# Patient Record
Sex: MALE | Race: BLACK OR AFRICAN AMERICAN | NOT HISPANIC OR LATINO | Employment: FULL TIME | URBAN - METROPOLITAN AREA
[De-identification: names, ages, dates, MRNs, and addresses within clinical notes are randomized per-mention and may not be internally consistent; named-entity substitution may affect disease eponyms.]

---
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
## 2019-07-01 ENCOUNTER — NON-PROVIDER VISIT (OUTPATIENT)
Dept: OCCUPATIONAL MEDICINE | Facility: CLINIC | Age: 19
End: 2019-07-01

## 2019-07-01 DIAGNOSIS — Z02.1 PRE-EMPLOYMENT DRUG SCREENING: ICD-10-CM

## 2019-07-01 LAB
AMP AMPHETAMINE: NORMAL
BAR BARBITURATES: NORMAL
BZO BENZODIAZEPINES: NORMAL
COC COCAINE: NORMAL
INT CON NEG: NORMAL
INT CON POS: NORMAL
MDMA ECSTASY: NORMAL
MET METHAMPHETAMINES: NORMAL
MTD METHADONE: NORMAL
OPI OPIATES: NORMAL
OXY OXYCODONE: NORMAL
PCP PHENCYCLIDINE: NORMAL
POC URINE DRUG SCREEN OCDRS: NORMAL
THC: NORMAL

## 2019-07-01 PROCEDURE — 80305 DRUG TEST PRSMV DIR OPT OBS: CPT | Performed by: PREVENTIVE MEDICINE

## 2019-11-08 VITALS
BODY MASS INDEX: 23.7 KG/M2 | DIASTOLIC BLOOD PRESSURE: 83 MMHG | WEIGHT: 160 LBS | RESPIRATION RATE: 16 BRPM | SYSTOLIC BLOOD PRESSURE: 144 MMHG | HEART RATE: 92 BPM | HEIGHT: 69 IN | OXYGEN SATURATION: 99 % | TEMPERATURE: 97.2 F

## 2019-11-08 PROCEDURE — 99284 EMERGENCY DEPT VISIT MOD MDM: CPT

## 2019-11-09 ENCOUNTER — HOSPITAL ENCOUNTER (EMERGENCY)
Facility: MEDICAL CENTER | Age: 19
End: 2019-11-09
Attending: EMERGENCY MEDICINE
Payer: MEDICAID

## 2019-11-09 DIAGNOSIS — M62.830 MUSCLE SPASM OF BACK: ICD-10-CM

## 2019-11-09 DIAGNOSIS — R11.2 NON-INTRACTABLE VOMITING WITH NAUSEA, UNSPECIFIED VOMITING TYPE: ICD-10-CM

## 2019-11-09 LAB
APPEARANCE UR: CLEAR
BACTERIA #/AREA URNS HPF: NEGATIVE /HPF
BILIRUB UR QL STRIP.AUTO: NEGATIVE
COLOR UR: YELLOW
EPI CELLS #/AREA URNS HPF: NEGATIVE /HPF
GLUCOSE UR STRIP.AUTO-MCNC: NEGATIVE MG/DL
HYALINE CASTS #/AREA URNS LPF: ABNORMAL /LPF
KETONES UR STRIP.AUTO-MCNC: 40 MG/DL
LEUKOCYTE ESTERASE UR QL STRIP.AUTO: ABNORMAL
MICRO URNS: ABNORMAL
NITRITE UR QL STRIP.AUTO: NEGATIVE
PH UR STRIP.AUTO: 5 [PH] (ref 5–8)
PROT UR QL STRIP: NEGATIVE MG/DL
RBC # URNS HPF: ABNORMAL /HPF
RBC UR QL AUTO: NEGATIVE
SP GR UR STRIP.AUTO: 1.02
UROBILINOGEN UR STRIP.AUTO-MCNC: 1 MG/DL
WBC #/AREA URNS HPF: ABNORMAL /HPF

## 2019-11-09 PROCEDURE — 36415 COLL VENOUS BLD VENIPUNCTURE: CPT

## 2019-11-09 PROCEDURE — 700102 HCHG RX REV CODE 250 W/ 637 OVERRIDE(OP): Performed by: EMERGENCY MEDICINE

## 2019-11-09 PROCEDURE — 96374 THER/PROPH/DIAG INJ IV PUSH: CPT

## 2019-11-09 PROCEDURE — A9270 NON-COVERED ITEM OR SERVICE: HCPCS | Performed by: EMERGENCY MEDICINE

## 2019-11-09 PROCEDURE — 700111 HCHG RX REV CODE 636 W/ 250 OVERRIDE (IP): Performed by: EMERGENCY MEDICINE

## 2019-11-09 PROCEDURE — 81001 URINALYSIS AUTO W/SCOPE: CPT

## 2019-11-09 PROCEDURE — 96375 TX/PRO/DX INJ NEW DRUG ADDON: CPT

## 2019-11-09 RX ORDER — CYCLOBENZAPRINE HCL 10 MG
10 TABLET ORAL ONCE
Status: COMPLETED | OUTPATIENT
Start: 2019-11-09 | End: 2019-11-09

## 2019-11-09 RX ORDER — HYDROCODONE BITARTRATE AND ACETAMINOPHEN 5; 325 MG/1; MG/1
2 TABLET ORAL ONCE
Status: COMPLETED | OUTPATIENT
Start: 2019-11-09 | End: 2019-11-09

## 2019-11-09 RX ORDER — KETOROLAC TROMETHAMINE 30 MG/ML
30 INJECTION, SOLUTION INTRAMUSCULAR; INTRAVENOUS ONCE
Status: COMPLETED | OUTPATIENT
Start: 2019-11-09 | End: 2019-11-09

## 2019-11-09 RX ORDER — HYDROCODONE BITARTRATE AND ACETAMINOPHEN 5; 325 MG/1; MG/1
1-2 TABLET ORAL EVERY 6 HOURS PRN
Qty: 12 TAB | Refills: 0 | Status: SHIPPED | OUTPATIENT
Start: 2019-11-09 | End: 2019-11-12

## 2019-11-09 RX ORDER — ONDANSETRON 2 MG/ML
4 INJECTION INTRAMUSCULAR; INTRAVENOUS ONCE
Status: COMPLETED | OUTPATIENT
Start: 2019-11-09 | End: 2019-11-09

## 2019-11-09 RX ORDER — IBUPROFEN 600 MG/1
600 TABLET ORAL EVERY 6 HOURS PRN
Qty: 30 TAB | Refills: 0 | Status: SHIPPED | OUTPATIENT
Start: 2019-11-09

## 2019-11-09 RX ORDER — CYCLOBENZAPRINE HCL 10 MG
10 TABLET ORAL 3 TIMES DAILY PRN
Qty: 30 TAB | Refills: 0 | Status: SHIPPED | OUTPATIENT
Start: 2019-11-09

## 2019-11-09 RX ORDER — ONDANSETRON 4 MG/1
4 TABLET, ORALLY DISINTEGRATING ORAL EVERY 8 HOURS PRN
Qty: 10 TAB | Refills: 0 | Status: SHIPPED | OUTPATIENT
Start: 2019-11-09

## 2019-11-09 RX ADMIN — ONDANSETRON 4 MG: 2 INJECTION INTRAMUSCULAR; INTRAVENOUS at 01:19

## 2019-11-09 RX ADMIN — HYDROCODONE BITARTRATE AND ACETAMINOPHEN 2 TABLET: 5; 325 TABLET ORAL at 02:19

## 2019-11-09 RX ADMIN — KETOROLAC TROMETHAMINE 30 MG: 30 INJECTION, SOLUTION INTRAMUSCULAR; INTRAVENOUS at 01:19

## 2019-11-09 RX ADMIN — CYCLOBENZAPRINE 10 MG: 10 TABLET, FILM COATED ORAL at 01:19

## 2019-11-09 NOTE — ED TRIAGE NOTES
"Chief Complaint   Patient presents with   • Back Pain     Patient reports that at noon he ate [employer] and since then has been vomiting, aches, and back pain    • N/V     /83   Pulse 92   Temp 36.2 °C (97.2 °F) (Temporal)   Resp 16   Ht 1.753 m (5' 9\")   Wt 72.6 kg (160 lb)   SpO2 99%       Patient placed in [facility] due to having an infant, educated on ed triage process, instructed to notify staff of any new or worsening symptoms.   "

## 2019-11-09 NOTE — DISCHARGE INSTRUCTIONS
Ice to painful areas 20 minutes at a time 3 - 4 times a day. Massage may help. No drinking or driving on Norco or Flexeril. Return for increasing pain, weakness, numbness, abdominal pain, fever, or loss of bowel or bladder function.

## 2019-11-09 NOTE — ED NOTES
Pt given discharge instructions. Medication education provided. Pt aware not to drive after taking narcotics. PIV removed, dressing applied. Signed copy in chart. Pt states all belongings in possession. Pt ambulatory off unit with steady gait.

## 2019-11-09 NOTE — ED NOTES
"Pt to G37 via wheelchair, in obvious discomfort.  Reporting 10/10 back pain \"from my butt to my neck\"  "

## 2019-11-09 NOTE — ED PROVIDER NOTES
ED Provider Note    Scribed for Jose David Hanna M.D. by Thomas Gross. 11/9/2019, 1:08 AM.    Primary care provider: Pcp Pt States None  Means of arrival: Walk-in  History obtained from: Patient  History limited by: None    CHIEF COMPLAINT  Chief Complaint   Patient presents with   • Back Pain     Patient reports that at noon he ate [employer] and since then has been vomiting, aches, and back pain    • N/V       HPI  Gabe Moctezuma is a 19 y.o. male who presents to the Emergency Department for evaluation of back pain onset 10 hours ago. Patient states that he ate [employer] for lunch and shortly after had 7-8 episodes of emesis which he describes as pink in color. He reports that he developed back pain that radiates to his neck due to the exertion from vomiting. Additionally, he reports of a headache, diarrhea and chills, but denies any fever, numbness or tingling of the extremities. He states he was nauseous, which since has resolved. Patient states that he had a back muscle spasm at the age of 8 which paralyzed him for 10 minutes. No history of IV drug use. No allergies to medications.     REVIEW OF SYSTEMS  Pertinent positives include back pain, neck pain, headache, diarrhea and chills. Pertinent negatives include nausea, fever, numbness or tingling of the extremities.    PAST MEDICAL HISTORY   Back spasms    SURGICAL HISTORY  patient denies any surgical history    SOCIAL HISTORY  Social History     Tobacco Use   • Smoking status: Current Every Day Smoker     Packs/day: 0.50     Types: Cigarettes   • Smokeless tobacco: Never Used   Substance Use Topics   • Alcohol use: Yes     Comment: occ.   • Drug use: No      Social History     Substance and Sexual Activity   Drug Use No       FAMILY HISTORY  None noted.    CURRENT MEDICATIONS  Home Medications     Reviewed by Karlene Bellamy R.N. (Registered Nurse) on 11/08/19 at 2351  Med List Status: <None>   Medication Last Dose Status        Patient Refugio Taking any  "Medications                     ALLERGIES  No Known Allergies    PHYSICAL EXAM  VITAL SIGNS: /83   Pulse 92   Temp 36.2 °C (97.2 °F) (Temporal)   Resp 16   Ht 1.753 m (5' 9\")   Wt 72.6 kg (160 lb)   SpO2 99%   BMI 23.63 kg/m²     Constitutional: Well developed, Well nourished, moderate distress.   HENT: Normocephalic, Atraumatic, Oropharynx moist.   Eyes: Conjunctiva normal, No discharge.   Neck: Supple, No stridor. No vertebral point tenderness. Paraspinal muscle tenderness.  Cardiovascular: Normal heart rate, Normal rhythm, No murmurs, equal pulses.   Pulmonary: Normal breath sounds, No respiratory distress, No wheezing, No rales, No rhonchi.  Chest: No chest wall tenderness or deformity.   Abdomen: No pain or tenderness over McBurney's point. Soft, No masses, no rebound, no guarding.   Back:  Paraspinal muscle spasm and tenderness to T12-L5 bilaterally. Negative straight leg raise bilaterally to 90 degrees. No CVA tenderness.   Musculoskeletal: No major deformities noted.  Skin: Warm, Dry, No erythema, No rash.   Neurologic: Alert & oriented x 3, Normal motor function,  No focal deficits noted.   Psychiatric: Affect normal, Judgment normal, Mood normal.     LABS  Results for orders placed or performed during the hospital encounter of 11/09/19   URINALYSIS (UA)   Result Value Ref Range    Color Yellow     Character Clear     Specific Gravity 1.023 <1.035    Ph 5.0 5.0 - 8.0    Glucose Negative Negative mg/dL    Ketones 40 (A) Negative mg/dL    Protein Negative Negative mg/dL    Bilirubin Negative Negative    Urobilinogen, Urine 1.0 Negative    Nitrite Negative Negative    Leukocyte Esterase Trace (A) Negative    Occult Blood Negative Negative    Micro Urine Req Microscopic    URINE MICROSCOPIC (W/UA)   Result Value Ref Range    WBC 0-2 (A) /hpf    RBC 0-2 (A) /hpf    Bacteria Negative None /hpf    Epithelial Cells Negative /hpf    Hyaline Cast 0-2 /lpf     All labs reviewed by me.    COURSE & MEDICAL " DECISION MAKING  Pertinent Labs & Imaging studies reviewed. (See chart for details)    1:08 AM - Patient seen and examined at bedside. Patient will be treated with flexeril 10 mg, zofran 4 mg, toradol 30 mg,and norco 5-325 mg. Ordered urine microscopic and urnialyis to evaluate his symptoms. The differential diagnoses include but are not limited to: gastroenteritis vs back muscle spasm vs UTI vs food poisoning.      Medical Decision Making: At this point time I think the patient most likely got gastroenteritis or food poisoning causing significant nausea and vomiting.  I think because of the vomiting he caused muscle spasms in his back.  Patient does not have any vertebral point tenderness I do not think he has any discitis or epidural abscess.  Patient does not have any abdominal pain there is really no stopped.  At this point time will discharge patient home with Flexeril to help with back pain and muscle spasm as well as Norco for pain.  Patient will be given Zofran in case he has further vomiting.    I reviewed prescription monitoring program for patient's narcotic use before prescribing a scheduled drug.The patient will not drink alcohol nor drive with prescribed medications. The patient will return for new or worsening symptoms and is stable at the time of discharge.    The patient is referred to a primary physician for blood pressure management, diabetic screening, and for all other preventative health concerns.    In prescribing controlled substances to this patient, I certify that I have obtained and reviewed the medical history of Gabe Moctezuma. I have also made a good shari effort to obtain applicable records from other providers who have treated the patient and records did not demonstrate any increased risk of substance abuse that would prevent me from prescribing controlled substances.     I have conducted a physical exam and documented it. I have reviewed Mr. Moctezuma’s prescription history as maintained by  the [place] Prescription Monitoring Program.     I have assessed the patient’s risk for abuse, dependency, and addiction using the validated Opioid Risk Tool available at https://www.mdcalc.com/fzpony-yjyw-xybe-ort-narcotic-abuse.     Given the above, I believe the benefits of controlled substance therapy outweigh the risks. The reasons for prescribing controlled substances include non-narcotic, oral analgesic alternatives have been inadequate for pain control. Accordingly, I have discussed the risk and benefits, treatment plan, and alternative therapies with the patient.     DISPOSITION:  Patient will be discharged home in stable condition.    FOLLOW UP:  Your doctor    Schedule an appointment as soon as possible for a visit in 1 week      [address] [address]  [phone]    If you need a doctor    [address] [address] [phone]    If you need a doctor      OUTPATIENT MEDICATIONS:  New Prescriptions    CYCLOBENZAPRINE (FLEXERIL) 10 MG TAB    Take 1 Tab by mouth 3 times a day as needed.    HYDROCODONE-ACETAMINOPHEN (NORCO) 5-325 MG TAB PER TABLET    Take 1-2 Tabs by mouth every 6 hours as needed for up to 3 days.    IBUPROFEN (MOTRIN) 600 MG TAB    Take 1 Tab by mouth every 6 hours as needed.    ONDANSETRON (ZOFRAN ODT) 4 MG TABLET DISPERSIBLE    Take 1 Tab by mouth every 8 hours as needed.       FINAL IMPRESSION  1. Muscle spasm of back    2. Non-intractable vomiting with nausea, unspecified vomiting type          I, Thomas Gross (Devendra), am scribing for, and in the presence of, Jose David Hanna M.D.    Electronically signed by: Thomas Gross (Devendra), 11/9/2019    IJose David M.D. personally performed the services described in this documentation, as scribed by Thomas Gross in my presence, and it is both accurate and complete.E.    The note accurately reflects work and decisions made by me.  Jose David QUIROGA  Jacques  11/9/2019  3:37 AM

## 2021-11-18 ENCOUNTER — HOSPITAL ENCOUNTER (EMERGENCY)
Facility: MEDICAL CENTER | Age: 21
End: 2021-11-18
Attending: EMERGENCY MEDICINE
Payer: MEDICAID

## 2021-11-18 ENCOUNTER — APPOINTMENT (OUTPATIENT)
Dept: RADIOLOGY | Facility: MEDICAL CENTER | Age: 21
End: 2021-11-18
Attending: EMERGENCY MEDICINE
Payer: MEDICAID

## 2021-11-18 VITALS
TEMPERATURE: 96.9 F | OXYGEN SATURATION: 98 % | RESPIRATION RATE: 15 BRPM | DIASTOLIC BLOOD PRESSURE: 75 MMHG | HEART RATE: 70 BPM | BODY MASS INDEX: 23.28 KG/M2 | WEIGHT: 157.63 LBS | SYSTOLIC BLOOD PRESSURE: 124 MMHG

## 2021-11-18 DIAGNOSIS — M24.542 CONTRACTURE OF FINGER JOINT, LEFT: ICD-10-CM

## 2021-11-18 PROCEDURE — 99283 EMERGENCY DEPT VISIT LOW MDM: CPT

## 2021-11-18 PROCEDURE — 700101 HCHG RX REV CODE 250: Performed by: EMERGENCY MEDICINE

## 2021-11-18 PROCEDURE — A9270 NON-COVERED ITEM OR SERVICE: HCPCS | Performed by: EMERGENCY MEDICINE

## 2021-11-18 PROCEDURE — 73140 X-RAY EXAM OF FINGER(S): CPT | Mod: LT

## 2021-11-18 PROCEDURE — 700102 HCHG RX REV CODE 250 W/ 637 OVERRIDE(OP): Performed by: EMERGENCY MEDICINE

## 2021-11-18 RX ORDER — LIDOCAINE HYDROCHLORIDE 20 MG/ML
10 INJECTION, SOLUTION INFILTRATION; PERINEURAL ONCE
Status: COMPLETED | OUTPATIENT
Start: 2021-11-18 | End: 2021-11-18

## 2021-11-18 RX ORDER — AMOXICILLIN AND CLAVULANATE POTASSIUM 875; 125 MG/1; MG/1
1 TABLET, FILM COATED ORAL 2 TIMES DAILY
Qty: 14 TABLET | Refills: 0 | Status: SHIPPED | OUTPATIENT
Start: 2021-11-18 | End: 2021-11-25

## 2021-11-18 RX ORDER — AMOXICILLIN AND CLAVULANATE POTASSIUM 875; 125 MG/1; MG/1
1 TABLET, FILM COATED ORAL ONCE
Status: COMPLETED | OUTPATIENT
Start: 2021-11-18 | End: 2021-11-18

## 2021-11-18 RX ADMIN — AMOXICILLIN AND CLAVULANATE POTASSIUM 1 TABLET: 875; 125 TABLET, FILM COATED ORAL at 18:57

## 2021-11-18 RX ADMIN — LIDOCAINE HYDROCHLORIDE 10 ML: 20 INJECTION, SOLUTION INFILTRATION; PERINEURAL at 17:45

## 2021-11-18 NOTE — ED TRIAGE NOTES
Chief Complaint   Patient presents with   • Finger Pain     left finger pinkie finger , +swelling, piece of thread      Pt ambulated to triage with above complaint, pt said he had surgery in august and noted a piece of thread . Pt reports yellow discharge.

## 2021-11-19 NOTE — ED NOTES
DC home with written instructions to follow up with hand surgeon. He is ambulatory on DC.  One prescriptions sent to pharmacy and pt educated on how to take.

## 2021-11-19 NOTE — ED PROVIDER NOTES
ED Provider Note    CHIEF COMPLAINT  Chief Complaint   Patient presents with   • Finger Pain     left finger pinkie finger , +swelling, piece of thread        HPI  Gabe Moctezuma is a 21 y.o. male who presents for evaluation of left fifth digit pain and swelling.  The patient reports that in [place] several months ago he sustained a traumatic injury to the left hand.  He had to go to the operating room in [place] and had a reported tendon repair.  He then developed some complications and had to go back to the operating room at a different facility several months ago.  He noticed that he had some pain at the base in the volar aspect of the left fifth digit.  He noticed what he thought was some tissue but determine that it was likely a loose suture that was starting to come out and was covered with pus.  He denies any other medical history other than hypertension tetanus is up-to-date no high fevers or chills    REVIEW OF SYSTEMS  See HPI for further details.  No high fevers chills night sweats weight loss all other systems are negative.     PAST MEDICAL HISTORY  No past medical history on file.    FAMILY HISTORY  [unfilled]    SOCIAL HISTORY  Social History     Socioeconomic History   • Marital status: Single     Spouse name: Not on file   • Number of children: Not on file   • Years of education: Not on file   • Highest education level: Not on file   Occupational History   • Not on file   Tobacco Use   • Smoking status: Current Every Day Smoker     Packs/day: 0.50     Types: Cigarettes   • Smokeless tobacco: Never Used   Substance and Sexual Activity   • Alcohol use: Yes     Comment: occ.   • Drug use: No   • Sexual activity: Not on file   Other Topics Concern   • Not on file   Social History Narrative   • Not on file     Social Determinants of Health     Financial Resource Strain:    • Difficulty of Paying Living Expenses: Not on file   Food Insecurity:    • Worried About Running Out of Food in the Last Year: Not on  file   • Ran Out of Food in the Last Year: Not on file   Transportation Needs:    • Lack of Transportation (Medical): Not on file   • Lack of Transportation (Non-Medical): Not on file   Physical Activity:    • Days of Exercise per Week: Not on file   • Minutes of Exercise per Session: Not on file   Stress:    • Feeling of Stress : Not on file   Social Connections:    • Frequency of Communication with Friends and Family: Not on file   • Frequency of Social Gatherings with Friends and Family: Not on file   • Attends [employer] Services: Not on file   • Active Member of Clubs or Organizations: Not on file   • Attends Club or Organization Meetings: Not on file   • Marital Status: Not on file   Intimate Partner Violence:    • Fear of Current or Ex-Partner: Not on file   • Emotionally Abused: Not on file   • Physically Abused: Not on file   • Sexually Abused: Not on file   Housing Stability:    • Unable to Pay for Housing in the Last Year: Not on file   • Number of Places Lived in the Last Year: Not on file   • Unstable Housing in the Last Year: Not on file       SURGICAL HISTORY  No past surgical history on file.    CURRENT MEDICATIONS  Home Medications    **Home medications have not yet been reviewed for this encounter**         ALLERGIES  No Known Allergies    PHYSICAL EXAM  VITAL SIGNS: /75   Pulse 70   Temp 36.1 °C (96.9 °F) (Temporal)   Resp 15   Wt 71.5 kg (157 lb 10.1 oz)   SpO2 98%   BMI 23.28 kg/m²       Constitutional: Well developed, Well nourished, No acute distress, Non-toxic appearance.   HENT: Normocephalic, Atraumatic, Bilateral external ears normal, Oropharynx moist, No oral exudates, Nose normal.   Eyes: PERRLA, EOMI, Conjunctiva normal, No discharge.   Neck: Normal range of motion, No tenderness, Supple, No stridor.   Lymphatic: No lymphadenopathy noted.   Cardiovascular: Normal heart rate, Normal rhythm, No murmurs, No rubs, No gallops.   Thorax & Lungs: Normal breath sounds, No respiratory  distress, No wheezing, No chest tenderness.   Abdomen: Bowel sounds normal, Soft, No tenderness, No masses, No pulsatile masses.   Skin: Warm, Dry, No erythema, No rash.   Extremities: Left hand exam is notable for a digit that has mild circumferential swelling which apparently is chronic with chronic contracture. At the DIP joint there does appear to be some suture that is worked its way to the surface without pus   neurologic: Alert & oriented x 3, Normal motor function, Normal sensory function, No focal deficits noted.   Psychiatric: Affect normal, Judgment normal, Mood normal.     DX-FINGER(S) 2+ LEFT   Final Result      Soft tissue swelling. Otherwise negative left 5th finger series.            COURSE & MEDICAL DECISION MAKING  Pertinent Labs & Imaging studies reviewed. (See chart for details)  Digital block was performed at the base of the left digit with 2% lidocaine a total of 4 cc were used. I gently removed approximately 1.5 cm of suture material and attempted to express pus but was unable to. I suspect this is all chronic related to scar tissue and retained stitch that is working itself to the surface. Radiograph did not demonstrate any osteomyelitis or fusion of the joint. This appears to be more of a chronic issue. We will start him on Augmentin and I will refer him to hand surgery for follow-up in the next 3 to 4 days.    FINAL IMPRESSION  1. Left fifth finger postoperative contracture with retained foreign body         Electronically signed by: Jorge Underwood M.D., 11/18/2021 6:02 PM

## 2021-11-29 NOTE — DISCHARGE PLANNING
note:  Pt called to get contact information for Dr. Jacobson.   CM fax clinical information for Dr. Jacobson @ [facility].

## 2021-11-29 NOTE — DISCHARGE PLANNING
note:  Received a call from [facility], they do not accept pt's insurance.     Referral sent to Dr. Dax Kirk.   Notified pt and provided MDs office contact number.